# Patient Record
Sex: MALE
[De-identification: names, ages, dates, MRNs, and addresses within clinical notes are randomized per-mention and may not be internally consistent; named-entity substitution may affect disease eponyms.]

---

## 2022-05-09 ENCOUNTER — HOSPITAL ENCOUNTER (EMERGENCY)
Dept: HOSPITAL 97 - ER | Age: 37
Discharge: HOME | End: 2022-05-09
Payer: OTHER GOVERNMENT

## 2022-05-09 VITALS — OXYGEN SATURATION: 98 % | DIASTOLIC BLOOD PRESSURE: 88 MMHG | SYSTOLIC BLOOD PRESSURE: 132 MMHG

## 2022-05-09 VITALS — TEMPERATURE: 98.5 F

## 2022-05-09 DIAGNOSIS — F43.10: ICD-10-CM

## 2022-05-09 DIAGNOSIS — L03.032: Primary | ICD-10-CM

## 2022-05-09 PROCEDURE — 99284 EMERGENCY DEPT VISIT MOD MDM: CPT

## 2022-05-09 NOTE — EDPHYS
Physician Documentation                                                                           

 [facility]                                                                 

Name: Behzad Garnett                                                                              

Age: 36 yrs                                                                                       

Sex: Male                                                                                         

: 1985                                                                                   

MRN: [id number]                                                                                   

Arrival Date: 2022                                                                          

Time: 14:02                                                                                       

Account#: [id number]                                                                            

Bed 24                                                                                            

Private MD:                                                                                       

ED Physician Tyrone Talamantes                                                                      

HPI:                                                                                              

                                                                                             

15:15 This 36 yrs old  Male presents to ER via [facility] with complaints of toe      jh7 

      infection.                                                                                  

15:15 Onset: The symptoms/episode began/occurred 1 month(s) ago. Associated signs and         jh7 

      symptoms: Pertinent negatives: fever, rash, redness. 36-year-old male presents with         

      left great toe pain. He states that he had his entire toenail removed 1 month ago due       

      to chronic ingrown toenails. He denies any pain over the joint, and states that only        

      the bed is tender. Reports that his dogs have stepped on it multiple times and his          

      chicken pecked his toe 2 weeks ago. He states that he just wanted to get it checked out     

      before [facility]. Denied being on any antibiotics for this issue. He denies       

      any fever, chills, erythema, or any other symptoms at this time..                           

                                                                                                  

Historical:                                                                                       

- Allergies:                                                                                      

14:20 No Known Allergies;                                                                     aa5 

- PMHx:                                                                                           

14:20 Asthma; PTSD; Depressive disorder;                                                      aa5 

- PSHx:                                                                                           

14:20 None; Left ACL; R ankle; hernia with mesh;                                              aa5 

                                                                                                  

- Immunization history:: Adult Immunizations unknown.                                             

- Social history:: Smoking status: Reported history of juuling and/or vaping.                     

                                                                                                  

                                                                                                  

ROS:                                                                                              

15:15 Constitutional: Negative for fever, chills, and weight loss, Cardiovascular: Negative   jh7 

      for chest pain, palpitations, and edema, Respiratory: Negative for shortness of breath,     

      cough, wheezing, and pleuritic chest pain, Abdomen/GI: Negative for abdominal pain,         

      nausea, vomiting, diarrhea, and constipation.                                               

15:15 MS/extremity: Positive for pain, tenderness, Negative for erythema, swelling, warmth.       

15:15 Skin: Positive for Bleeding and pain over the nailbed of the L great toe..                  

15:15 All other systems are negative.                                                             

                                                                                                  

Exam:                                                                                             

15:15 Constitutional:  This is a well developed, well nourished patient who is awake, alert,  jh7 

      and in no acute distress. Cardiovascular:  Regular rate and rhythm with a normal S1 and     

      S2.  No gallops, murmurs, or rubs.  Normal PMI, no JVD.  No pulse deficits.                 

      Respiratory:  Lungs have equal breath sounds bilaterally, clear to auscultation and         

      percussion.  No rales, rhonchi or wheezes noted.  No increased work of breathing, no        

      retractions or nasal flaring. Neuro:  Awake and alert, GCS 15, oriented to person,          

      place, time, and situation.  Motor strength 5/5 in all extremities.  Sensory grossly        

      intact.  Normal gait.                                                                       

15:15 Musculoskeletal/extremity: ROM: intact in all extremities, Circulation is intact in all     

      extremities. the  left foot Sensation intact. Joints: All joints appear normal with         

      full range of motion.                                                                       

15:15 Skin: Appearance: Dried blood present over the nailbed.  There is tenderness to             

      palpation over the nailbed.  There is no purulent drainage or induration. Mild erythema     

      noted around the nailbed..                                                                  

                                                                                                  

Vital Signs:                                                                                      

15:07  / 84; Pulse 105; Resp 18 S; Temp 98.5(O); Pulse Ox 97% on R/A; Weight 115.67 kg  aa5 

      (R); Height 6 ft. 2 in. (187.96 cm) (R);                                                    

16:48  / 88; Pulse 102; Resp 18; Pulse Ox 98% ; Pain 3/10;                              ld1 

15:07 Body Mass Index 32.74 (115.67 kg, 187.96 cm)                                            aa5 

                                                                                                  

MDM:                                                                                              

14:56 Patient medically screened.                                                             [facility] 

17:02 Differential diagnosis: bacterial infection. Data reviewed: vital signs, nurses notes,  [facility] 

      radiologic studies, plain films. Data interpreted: Pulse oximetry: is 98 %.                 

      Interpretation: normal. Test interpretation: by ED physician or midlevel provider:          

      plain radiologic studies. ED course: The patient remained hemodynamically stable            

      throughout the ER visit. Informed him that there was no fracture noted on the x-ray.        

      Due to mild erythema around the nailbed, he will be prescribed antibiotics. Advised him     

      to follow-up with [facility]. If he develops increased redness, swelling,     

      fever, or any other concerning symptoms, he is to return to the ER..                        

                                                                                                  

                                                                                             

15:04 Order name: XRAY Foot LEFT 3 View; Complete Time: 17:01                                 [facility] 

                                                                                                  

Administered Medications:                                                                         

No medications were administered                                                                  

                                                                                                  

                                                                                                  

Disposition Summary:                                                                              

22 16:36                                                                                    

Discharge Ordered                                                                                 

      Location: Home                                                                          [facility] 

      Problem: new                                                                            [facility] 

      Symptoms: are unchanged                                                                 [facility] 

      Condition: Stable                                                                       [facility] 

      Diagnosis                                                                                   

        - Pain in left toe(s)                                                                 [facility] 

        - Cellulitis of toe                                                                   [facility] 

      Followup:                                                                               [facility] 

        - With: Aaron Wagner DPM                                                                 

        - When: 2 - 3 days                                                                         

        - Reason: Recheck today's complaints                                                       

      Discharge Instructions:                                                                     

        - Discharge Summary Sheet                                                             [facility] 

        - Cellulitis, Adult                                                                   [facility] 

        - Foot Pain                                                                           [facility] 

      Forms:                                                                                      

        - Medication Reconciliation Form                                                      [facility] 

        - Thank You Letter                                                                    [facility] 

        - Antibiotic Education                                                                [facility] 

        - Prescription Opioid Use                                                             [facility] 

      Prescriptions:                                                                              

        - mupirocin 2 % Topical ointment                                                           

            - apply 1 application by TOPICAL route 3 times per day; 1 tube; Refills: 0,       [facility] 

      Product Selection Permitted                                                                 

        - Bactrim -160 mg Oral Tablet                                                        

            - take 1 tablet by ORAL route every 12 hours for 10 days; 20 tablet; Refills: 0,  jh7 

      Product Selection Permitted                                                                 

Signatures:                                                                                       

Dispatcher [employer]                           Padmaja Dorsey RN                     RN   aa5                                                  

Shayla Correa FNP                   [facility]  [facility]                                                  

                                                                                                  

Corrections: (The following items were deleted from the chart)                                    

15:19 14:20 PMHx: None; aa5                                                                   aa5 

17:04 15:15 36-year-old male presents with left great toe pain. He states that he had his     jh 

      entire toenail removed 1 month ago due to chronic ingrown toenails. He denies any pain      

      over the joint, and states that only the bed is tender. Reports that his dogs have          

      stepped on it multiple times, and that he wanted to get it checked out. Denied being on     

      any antibiotics for this issue. He denies any fever, chills, erythema, or any other         

      symptoms at this time.. [facility]                                                                 

17:07 15:15 Skin: Appearance: Dried blood present over the nailbed. There is tenderness to    [facility] 

      palpation. There is no purulent drainage, erythema, or induration noted., [facility]               

                                                                                                  

**************************************************************************************************

## 2022-05-09 NOTE — ER
Nurse's Notes                                                                                     

 [facility]                                                                 

Name: Behzad Garnett                                                                              

Age: 36 yrs                                                                                       

Sex: Male                                                                                         

: 1985                                                                                   

MRN: [id number]                                                                                   

Arrival Date: 2022                                                                          

Time: 14:02                                                                                       

Account#: [id number]                                                                            

Bed 24                                                                                            

Private MD:                                                                                       

Diagnosis: Pain in left toe(s);Cellulitis of toe                                                  

                                                                                                  

Presentation:                                                                                     

                                                                                             

14:20 Chief complaint: Chief complaint: Patient states: left toenail fungal infection x 1     aa5 

      month ago, had toenail removed then, today left great toe became more painful. Pt           

      states "I went outside and barely hit my toe and it just started bleeding everywhere so     

      [facility]".                                             

15:07 Onset of symptoms was .                                                             aa5 

15:07 Acuity: JEROMY 3                                                                           aa5 

15:07 Coronavirus screen: At this time, the client does not indicate any symptoms associated  aa5 

      with coronavirus-19. Ebola Screen: No symptoms or risks identified at this time.            

      Initial Sepsis Screen: Does the patient meet any 2 criteria? No. Patient's initial          

      sepsis screen is negative. Does the patient have a suspected source of infection? No.       

      Patient's initial sepsis screen is negative. Risk Assessment: Do you want to hurt           

      yourself or someone else? Patient reports no desire to harm self or others.                 

15:07 Method Of Arrival: Ambulatory                                                           aa5 

                                                                                                  

Triage Assessment:                                                                                

14:20 General: Appears comfortable, Behavior is calm, cooperative. Pain: Complains of pain in aa5 

      left great toe Pain currently is 3 out of 10 on a pain scale. EENT: No signs and/or         

      symptoms were reported regarding the EENT system. Neuro: Level of Consciousness is          

      awake, alert, obeys commands, Oriented to person, place, time, situation.                   

      Cardiovascular: Patient's skin is warm and dry. Respiratory: Airway is patent               

      Respiratory effort is even, unlabored, Respiratory pattern is regular, symmetrical. GI:     

      No signs and/or symptoms were reported involving the gastrointestinal system. : No        

      signs and/or symptoms were reported regarding the genitourinary system. Derm: Skin is       

      pink, warm \T\ dry. Mild redness noted to left great toe, no bleeding or drainage noted.    

      Musculoskeletal: Range of motion: intact in all extremities.                                

                                                                                                  

Historical:                                                                                       

- Allergies:                                                                                      

14:20 No Known Allergies;                                                                     aa5 

- PMHx:                                                                                           

14:20 Asthma; PTSD; Depressive disorder;                                                      aa5 

- PSHx:                                                                                           

14:20 None; Left ACL; R ankle; hernia with mesh;                                              aa5 

                                                                                                  

- Immunization history:: Adult Immunizations unknown.                                             

- Social history:: Smoking status: Reported history of juuling and/or vaping.                     

                                                                                                  

                                                                                                  

Screenin:48 Abuse screen: Denies threats or abuse. Denies injuries from another. Nutritional        ld1 

      screening: No deficits noted. Tuberculosis screening: No symptoms or risk factors           

      identified. Fall Risk None identified.                                                      

                                                                                                  

Assessment:                                                                                       

16:48 Reassessment: See triage assessment.                                                    ld1 

                                                                                                  

Vital Signs:                                                                                      

15:07  / 84; Pulse 105; Resp 18 S; Temp 98.5(O); Pulse Ox 97% on R/A; Weight 115.67 kg  aa5 

      (R); Height 6 ft. 2 in. (187.96 cm) (R);                                                    

16:48  / 88; Pulse 102; Resp 18; Pulse Ox 98% ; Pain 3/10;                              ld1 

15:07 Body Mass Index 32.74 (115.67 kg, 187.96 cm)                                            aa 

                                                                                                  

ED Course:                                                                                        

14:02 Patient arrived in ED.                                                                  as  

14:20 Arm band placed on.                                                                     aa5 

14:37 Padmaja Hernandez, RN is Primary Nurse.                                                   aa5 

14:55 Shayla Correa FNP is PHCP.                                                          7 

14:55 Tyrone Talamantes MD is Attending Physician.                                             [facility] 

14:56 Tyrone Talamantes MD is Attending Physician.                                             [facility] 

15:18 Triage completed.                                                                       aa5 

16:31 Aaron Wagner DPM is Referral Physician.                                               [facility] 

16:41 XRAY Foot LEFT 3 View In Process Unspecified.                                           [facility]

16:48 Patient has correct armband on for positive identification. Placed in gown. Bed in low  ld1 

      position. Call light in reach. Side rails up X2. Cardiac monitor on. Pulse ox on. NIBP      

      on. Door closed. Noise minimized. Warm blanket given.                                       

16:48 No provider procedures requiring assistance completed. Patient did not have IV access   ld1 

      during this emergency room visit.                                                           

                                                                                                  

Administered Medications:                                                                         

No medications were administered                                                                  

                                                                                                  

                                                                                                  

Outcome:                                                                                          

16:36 Discharge ordered by MD.                                                                [facility] 

16:48 Discharged to home ambulatory.                                                          ld1 

16:48 Condition: stable                                                                           

16:48 Discharge instructions given to patient, Instructed on discharge instructions, follow       

      up and referral plans. medication usage, Demonstrated understanding of instructions,        

      follow-up care, medications.                                                                

16:58 Patient left the ED.                                                                    ld1 

                                                                                                  

Signatures:                                                                                       

Dispatcher [employer]                           Trini Estes Audri, RN                     RN   aa5                                                  

Shreya Whitehead RN                     RN   ld1                                                  

Shayla Correa, DANIELP                   FNP  7                                                  

                                                                                                  

Corrections: (The following items were deleted from the chart)                                    

15:18 14:20 Chief complaint: aa5                                                              aa5 

15:19 14:20 PMHx: None; aa5                                                                   aa5 

19:53 14:20 Chief complaint: Patient states: left toenail fungal infection x 1 month ago, had aa5 

      toenail removed then, today right great toe became more painful. Pt states "I went          

      outside and barely hit my toe and it just started bleeding everywhere [facility]" Chief complaint: Patient states: left toenail fungal       

      infection x 1 month ago, had toenail removed then, today right great toe became more        

      painful. Pt states "I went outside and barely hit my toe and it just started bleeding       

      everywhere so [facility]" aa5                            

                                                                                                  

**************************************************************************************************

## 2022-05-09 NOTE — RAD REPORT
EXAM DESCRIPTION:  RAD - Foot Left 3 View - 5/9/2022 4:39 pm

 

CLINICAL HISTORY:  PAIN

 

COMPARISON:  No comparisons

 

FINDINGS:  Mild soft tissue swelling is seen affecting the great toe. No fracture or dislocation.